# Patient Record
Sex: MALE | Race: WHITE | Employment: FULL TIME | URBAN - METROPOLITAN AREA
[De-identification: names, ages, dates, MRNs, and addresses within clinical notes are randomized per-mention and may not be internally consistent; named-entity substitution may affect disease eponyms.]

---

## 2024-01-05 ENCOUNTER — APPOINTMENT (OUTPATIENT)
Dept: PRIMARY CARE | Facility: CLINIC | Age: 34
End: 2024-01-05
Payer: COMMERCIAL

## 2024-01-05 NOTE — PROGRESS NOTES
Subjective   Patient ID: Ted Muñiz is a 33 y.o. male who presents for No chief complaint on file..    HPI Sick visit same day after hours no staff no chest pain no shortness of breath but since going to [place] last week and being exposed at a convention he felt as if he had an almost asthma-like quality to his lungs with some cough and perhaps slight wheeze no fever did not take much in the way of medication for this also with over time some hair loss especially frontotemporal and was concerned notes nutrition has not been what it has been in the past    Review of Systems    Objective   There were no vitals taken for this visit.    Physical Exam vital signs noted pulse 76 respiratory rate 12 blood pressure 133/73 pulse oximetry 97% alert and oriented x 3 NCAT some thinning of the frontal hair scalp is normal no coryza nares without discharge OP benign TM normal bilateral EAC clear bilateral no AC nodes no JVD chest clear to auscultation and percussion no wheezing no crackles some dry cough CV regular rate and rhythm S1-S2 without murmur gallop or rub extremities no clubbing cyanosis or edema normal distal pulses    Assessment/Plan impression male pattern alopecia   reactive airway disease  Plan May use Mucinex twice daily may use Cold-Eeze as needed May use vitamin C in future for primary prophylaxis good nutrition good hydration no antibiotics are needed as for the blood pressure there is family history of CAD continue to watch the diet especially low salt using some exercise  For the hair scalp is clear but may increase use of shoulders to once per week and use a depression other times daily to help may also consider in the future finasteride or other medications or topical agents or dermatology recheck for regular visit physical examination and blood work

## 2024-09-16 ENCOUNTER — OFFICE VISIT (OUTPATIENT)
Dept: URGENT CARE | Age: 34
End: 2024-09-16
Payer: COMMERCIAL

## 2024-09-16 VITALS
OXYGEN SATURATION: 99 % | TEMPERATURE: 98.4 F | RESPIRATION RATE: 18 BRPM | BODY MASS INDEX: 31.5 KG/M2 | WEIGHT: 225 LBS | HEIGHT: 71 IN | DIASTOLIC BLOOD PRESSURE: 85 MMHG | HEART RATE: 82 BPM | SYSTOLIC BLOOD PRESSURE: 133 MMHG

## 2024-09-16 DIAGNOSIS — J01.90 ACUTE NON-RECURRENT SINUSITIS, UNSPECIFIED LOCATION: Primary | ICD-10-CM

## 2024-09-16 RX ORDER — PSEUDOEPHEDRINE HCL 120 MG/1
120 TABLET, FILM COATED, EXTENDED RELEASE ORAL EVERY 12 HOURS
Qty: 24 TABLET | Refills: 0 | Status: SHIPPED | OUTPATIENT
Start: 2024-09-16 | End: 2025-09-16

## 2024-09-16 RX ORDER — AZITHROMYCIN 250 MG/1
TABLET, FILM COATED ORAL
Qty: 6 TABLET | Refills: 0 | Status: SHIPPED | OUTPATIENT
Start: 2024-09-16

## 2024-09-16 NOTE — PROGRESS NOTES
"Subjective   Patient ID: Ted Muñiz is a 34 y.o. male. They present today with a chief complaint of Sinusitis (X 5days).    History of Present Illness  Patient is a pleasant 34-year-old white male, no significant past medical history, presented to clinic with chief complaint of upper respiratory congestion.  Patient is reporting approximately 7-day history of persistent upper respiratory congestion with increasing sinus pain and pressure and increasingly purulent nasal discharge.  Denies any fever or chills.  Denies any ear pain.  He does endorse some throat discomfort however denies any dysphagia odynophagia trismus drooling or change in voice.  Has been using Mucinex at home with minimal relief.  No further complaints.  No rashes.      Sinusitis      Past Medical History  Allergies as of 09/16/2024    (No Known Allergies)       (Not in a hospital admission)         History reviewed. No pertinent past medical history.    History reviewed. No pertinent surgical history.     reports that he has never smoked. He has never used smokeless tobacco. He reports that he does not drink alcohol and does not use drugs.    Review of Systems  Review of Systems               All review of systems negative unless stated in HPI.                 Objective    Vitals:    09/16/24 1734   BP: 133/85   BP Location: Left arm   Patient Position: Sitting   BP Cuff Size: Adult   Pulse: 82   Resp: 18   Temp: 36.9 °C (98.4 °F)   TempSrc: Oral   SpO2: 99%   Weight: 102 kg (225 lb)   Height: 1.803 m (5' 11\")     No LMP for male patient.    Physical Exam  General: Vitals Noted. No distress. Normocephalic.     HEENT: TMs normal, EOMI, normal conjunctiva, patent nares with erythematous edematous and appear nasal turbinates and clear rhinorrhea bilaterally.  Posterior oropharynx with signs of postnasal drainage without any erythema swelling or tonsillar exudate.  Uvula is in the midline and nonedematous.  No drooling.  No trismus.  Positive " tenderness to palpation over the bilateral frontal sinuses with no overlying skin changes.    Neck: Supple with no adenopathy.     Cardiac: Regular Rate and Rhythm. No murmur.     Pulmonary: Equal breath sounds bilaterally. No wheezes, rhonchi, or rales.    Abdomen: Soft, non-tender, with normal bowel sounds.     Musculoskeletal: Moves all extremities, no effusion, no edema.     Skin: No obvious rashes.  Procedures    Point of Care Test & Imaging Results from this visit    No results found.    Diagnostic study results (if any) were reviewed by Jovanny Curtis PA-C.    Assessment/Plan   Allergies, medications, history, and pertinent labs/EKGs/Imaging reviewed by Jovanny Curtis PA-C.     Medical Decision Making  Patient was seen eval in the clinic with chief complaint of worsening upper respiratory congestion with increasing sinus pain and pressure and increasingly purulent nasal discharge.  On exam patient is nontoxic well-appearing resting bed comfortably no acute distress.  Vital signs are stable, afebrile.  Chest is clear, heart is regular, belly is soft and nontender.  ENT exam as above concerning for an acute sinusitis.  Minimal concern for strep.  No concern for acute otitis media.  Minimal concern for pneumonia.  Will treat with a Z-Conner as well as pseudoephedrine to be as as needed for congestion.  Advised to follow closely primary care physician in the next week.  Reviewed my impression, plan, strict return precautions with the patient.  He expresses understanding and agreement plan of care.    Orders and Diagnoses  Diagnoses and all orders for this visit:  Acute non-recurrent sinusitis, unspecified location  -     azithromycin (Zithromax) 250 mg tablet; Take 2 tablets for one day then 1 tablet per day for 4 days  -     pseudoephedrine ER (Sudafed-12 Hour) 120 mg 12 hr tablet; Take 1 tablet (120 mg) by mouth every 12 hours. Do not crush, chew, or split.        Medical Admin Record      Follow Up  Instructions  No follow-ups on file.    Patient disposition: Home    Electronically signed by Jovanny Curtis PA-C  5:58 PM

## 2025-08-29 ENCOUNTER — APPOINTMENT (OUTPATIENT)
Dept: PRIMARY CARE | Facility: CLINIC | Age: 35
End: 2025-08-29
Payer: COMMERCIAL